# Patient Record
Sex: MALE | Race: WHITE | NOT HISPANIC OR LATINO
[De-identification: names, ages, dates, MRNs, and addresses within clinical notes are randomized per-mention and may not be internally consistent; named-entity substitution may affect disease eponyms.]

---

## 2022-12-14 ENCOUNTER — TRANSCRIPTION ENCOUNTER (OUTPATIENT)
Age: 72
End: 2022-12-14

## 2023-01-07 ENCOUNTER — FORM ENCOUNTER (OUTPATIENT)
Age: 73
End: 2023-01-07

## 2023-01-24 ENCOUNTER — TRANSCRIPTION ENCOUNTER (OUTPATIENT)
Age: 73
End: 2023-01-24

## 2023-02-07 ENCOUNTER — FORM ENCOUNTER (OUTPATIENT)
Age: 73
End: 2023-02-07

## 2023-02-14 ENCOUNTER — APPOINTMENT (OUTPATIENT)
Dept: INFECTIOUS DISEASE | Facility: CLINIC | Age: 73
End: 2023-02-14
Payer: MEDICARE

## 2023-02-14 ENCOUNTER — NON-APPOINTMENT (OUTPATIENT)
Age: 73
End: 2023-02-14

## 2023-02-14 VITALS
TEMPERATURE: 97.8 F | HEART RATE: 65 BPM | DIASTOLIC BLOOD PRESSURE: 50 MMHG | SYSTOLIC BLOOD PRESSURE: 98 MMHG | OXYGEN SATURATION: 98 %

## 2023-02-14 DIAGNOSIS — L03.115 CELLULITIS OF RIGHT LOWER LIMB: ICD-10-CM

## 2023-02-14 PROCEDURE — 99215 OFFICE O/P EST HI 40 MIN: CPT

## 2023-02-15 PROBLEM — L03.115 CELLULITIS OF FOOT, RIGHT: Status: ACTIVE | Noted: 2023-02-13

## 2023-02-15 RX ORDER — FLASH GLUCOSE SENSOR
KIT MISCELLANEOUS
Qty: 2 | Refills: 0 | Status: COMPLETED | COMMUNITY
Start: 2022-12-30

## 2023-02-15 RX ORDER — INSULIN DETEMIR 100 [IU]/ML
100 INJECTION, SOLUTION SUBCUTANEOUS
Qty: 3 | Refills: 0 | Status: ACTIVE | COMMUNITY
Start: 2022-12-25

## 2023-02-15 RX ORDER — AMOXICILLIN 500 MG/1
500 CAPSULE ORAL
Qty: 21 | Refills: 0 | Status: COMPLETED | COMMUNITY
Start: 2022-10-18

## 2023-02-15 RX ORDER — NIFEDIPINE 60 MG/1
60 TABLET, FILM COATED, EXTENDED RELEASE ORAL
Qty: 90 | Refills: 0 | Status: COMPLETED | COMMUNITY
Start: 2022-03-03

## 2023-02-15 RX ORDER — SPIRONOLACTONE 25 MG/1
25 TABLET ORAL
Qty: 30 | Refills: 0 | Status: ACTIVE | COMMUNITY
Start: 2023-01-03

## 2023-02-15 RX ORDER — METOPROLOL SUCCINATE 50 MG/1
50 TABLET, EXTENDED RELEASE ORAL
Qty: 1 | Refills: 0 | Status: COMPLETED | COMMUNITY
Start: 2022-12-23

## 2023-02-15 RX ORDER — SODIUM CHLORIDE 0.65 %
0.65 AEROSOL, SPRAY (ML) NASAL
Qty: 77 | Refills: 0 | Status: COMPLETED | COMMUNITY
Start: 2022-12-23

## 2023-02-15 RX ORDER — CHLORHEXIDINE GLUCONATE 4 %
400 (240 MG) LIQUID (ML) TOPICAL
Qty: 1 | Refills: 0 | Status: COMPLETED | COMMUNITY
Start: 2022-12-23

## 2023-02-15 RX ORDER — ROSUVASTATIN CALCIUM 20 MG/1
20 TABLET, FILM COATED ORAL
Qty: 90 | Refills: 0 | Status: ACTIVE | COMMUNITY
Start: 2023-01-04

## 2023-02-15 RX ORDER — SACUBITRIL AND VALSARTAN 24; 26 MG/1; MG/1
24-26 TABLET, FILM COATED ORAL
Qty: 1 | Refills: 0 | Status: COMPLETED | COMMUNITY
Start: 2022-12-23

## 2023-02-15 RX ORDER — SPIRONOLACTONE 50 MG/1
50 TABLET ORAL
Qty: 1 | Refills: 0 | Status: COMPLETED | COMMUNITY
Start: 2022-12-23

## 2023-02-15 RX ORDER — TORSEMIDE 20 MG/1
20 TABLET ORAL
Qty: 1 | Refills: 0 | Status: COMPLETED | COMMUNITY
Start: 2022-12-26

## 2023-02-15 RX ORDER — HYDROCORTISONE ACETATE 25 MG/1
25 SUPPOSITORY RECTAL
Qty: 24 | Refills: 0 | Status: COMPLETED | COMMUNITY
Start: 2022-12-24

## 2023-02-15 RX ORDER — ACETAMINOPHEN AND CODEINE PHOSPHATE 300; 30 MG/1; MG/1
300-30 TABLET ORAL
Qty: 10 | Refills: 0 | Status: COMPLETED | COMMUNITY
Start: 2022-10-10

## 2023-02-15 RX ORDER — FUROSEMIDE 40 MG/1
40 TABLET ORAL
Qty: 180 | Refills: 0 | Status: COMPLETED | COMMUNITY
Start: 2022-05-10

## 2023-02-15 RX ORDER — ALPRAZOLAM 0.25 MG/1
0.25 TABLET ORAL
Qty: 14 | Refills: 0 | Status: COMPLETED | COMMUNITY
Start: 2022-12-24

## 2023-02-15 RX ORDER — FLASH GLUCOSE SCANNING READER
EACH MISCELLANEOUS
Qty: 1 | Refills: 0 | Status: COMPLETED | COMMUNITY
Start: 2022-12-30

## 2023-02-15 RX ORDER — ERTAPENEM SODIUM 1 G/1
1 INJECTION, POWDER, LYOPHILIZED, FOR SOLUTION INTRAMUSCULAR; INTRAVENOUS
Refills: 0 | Status: ACTIVE | COMMUNITY

## 2023-02-15 RX ORDER — TORSEMIDE 10 MG/1
10 TABLET ORAL
Qty: 3 | Refills: 0 | Status: COMPLETED | COMMUNITY
Start: 2022-12-29

## 2023-02-15 RX ORDER — SODIUM BICARBONATE 650 MG/1
650 TABLET ORAL
Qty: 2 | Refills: 0 | Status: COMPLETED | COMMUNITY
Start: 2022-12-23

## 2023-02-15 RX ORDER — ALPRAZOLAM 1 MG/1
1 TABLET ORAL
Qty: 15 | Refills: 0 | Status: COMPLETED | COMMUNITY
Start: 2022-11-10

## 2023-02-15 RX ORDER — GABAPENTIN 300 MG/1
300 CAPSULE ORAL
Qty: 1 | Refills: 0 | Status: ACTIVE | COMMUNITY
Start: 2022-12-23

## 2023-02-15 RX ORDER — OXYCODONE AND ACETAMINOPHEN 5; 325 MG/1; MG/1
5-325 TABLET ORAL
Qty: 28 | Refills: 0 | Status: COMPLETED | COMMUNITY
Start: 2023-01-04

## 2023-02-15 RX ORDER — PEN NEEDLE, DIABETIC 29 G X1/2"
31G X 5 MM NEEDLE, DISPOSABLE MISCELLANEOUS
Qty: 100 | Refills: 0 | Status: COMPLETED | COMMUNITY
Start: 2022-06-04

## 2023-02-15 RX ORDER — AMOXICILLIN 875 MG/1
875 TABLET, FILM COATED ORAL
Qty: 14 | Refills: 0 | Status: COMPLETED | COMMUNITY
Start: 2022-10-10

## 2023-02-15 RX ORDER — SACUBITRIL AND VALSARTAN 97; 103 MG/1; MG/1
97-103 TABLET, FILM COATED ORAL
Qty: 180 | Refills: 0 | Status: COMPLETED | COMMUNITY
Start: 2022-10-17

## 2023-02-15 RX ORDER — HYDROCODONE BITARTRATE AND ACETAMINOPHEN 5; 325 MG/1; MG/1
5-325 TABLET ORAL
Qty: 12 | Refills: 0 | Status: COMPLETED | COMMUNITY
Start: 2022-10-18

## 2023-02-15 RX ORDER — PANTOPRAZOLE 40 MG/1
40 TABLET, DELAYED RELEASE ORAL
Qty: 1 | Refills: 0 | Status: ACTIVE | COMMUNITY
Start: 2022-12-23

## 2023-02-15 RX ORDER — CLONIDINE HYDROCHLORIDE 0.1 MG/1
0.1 TABLET ORAL
Qty: 270 | Refills: 0 | Status: COMPLETED | COMMUNITY
Start: 2022-07-13

## 2023-02-15 RX ORDER — BUMETANIDE 2 MG/1
2 TABLET ORAL
Qty: 60 | Refills: 0 | Status: ACTIVE | COMMUNITY
Start: 2023-01-03

## 2023-02-15 RX ORDER — RIVAROXABAN 15 MG/1
15 TABLET, FILM COATED ORAL
Qty: 7 | Refills: 0 | Status: COMPLETED | COMMUNITY
Start: 2022-12-23

## 2023-02-15 RX ORDER — INSULIN LISPRO 100 [IU]/ML
100 INJECTION, SOLUTION INTRAVENOUS; SUBCUTANEOUS
Qty: 6 | Refills: 0 | Status: ACTIVE | COMMUNITY
Start: 2022-12-23

## 2023-02-15 NOTE — ASSESSMENT
[FreeTextEntry1] : patient with multiple medical problems\par patient s/p cholecystectomy with complication of abscess in the gall bladder fossa treated with drainage and IV antibiotics .\par \par He was re-admitted due to cellulitis of the foot but was found to have a liver abscess with the same organisms (ESBL E coli and Strep anginosus).  He had an E coli bacteremia\par \par He is tolerating the antibiotics \par \par Drainage from the catheter is minimal\par It is unclear if this is due to the abscess having resolved, or the location of the catheter\par \par goals of treatment include treating with IV invanz until the lesion resolves on CT scan.\par He will continue to require IV antibiotics as the ESBL is resistant to all oral therapy\par \par Plan:\par \par 1. repeat CT scan at [place] with evaluation by IR of catheter\par 2. continue with Invanz at least thru 28 Feb\par 3. f/u 28 Feb\par 4. Labs weekly at [facility]: CBC, CMP\par \par discussed treatment plan with the patient, who is in agreement [Treatment Education] : treatment education [Treatment Adherence] : treatment adherence [Drug Interactions / Side Effects] : drug interactions/side effects [Anticipatory Guidance] : anticipatory guidance

## 2023-02-15 NOTE — REASON FOR VISIT
[Post Hospitalization] : a post hospitalization visit [Family Member] : family member [FreeTextEntry1] : fu from hospital stay ([facility]) for Bacteremia. Drain in place\par IVAB(Invanz 1g QD), with BETSY (Brandon Kessler) \par discuss recent bw results.. still needs apt with IR

## 2023-02-15 NOTE — HISTORY OF PRESENT ILLNESS
[FreeTextEntry1] : 71 yo Male with chronic lower extremity lymphedema and mild right foot cellulitis, and heel ulcer \par Patient with CAD S/P CABG, Prostate Cancer S/P Prostatectomy, PM, DM, HTN, CHF\par s/p cholecystectomy at [facility] 23 Nov.\par \par He was initially admitted from 4 to 23 Dec 2022.\par He was found to have a fluid collection in the gall bladder fossa.\par aspiration of the  5 cc of purulent fluid by IR on 4 Dec, which grew out an ESBL E coli and a Strep anginosus.  He was treated with meropenem\par \par He was re-admitted to [place] from 5 Jan to 8 Feb 2023.\par He was initially admitted due to BL LE edema and cellulitis of the R heel.  It responded to Ancef\par \par \par He developed septic shock on 1/12, requiring transfer t the CCU on [facility].\par He required intubation on 01/14 due to worsening encephalopathy & Acidosis\par Blood cultures positive from 1/11 with an ESBL E coli.\par \par Patient with liver abscess: s/p drainage by IR.  Culture with Strep anginosis and ESBL E coli 14 Jan.\par He was treated with IV Invanz\par He was discharged to a [facility] to complete his iv antibiotics \par \par \par POD #31 -  drainage catheter placement into the liver\par #31 antibiotics - Invanz\par --\par Patient with scant drainage from the catheter.\par he is tolerating the antibiotics \par \par Patient denies any new symptoms: nausea, emesis, diarrhea\par Patient denies any pain in the RUQ except around the exit site of the catheter\par \par

## 2023-02-15 NOTE — REVIEW OF SYSTEMS
[Fever] : no fever [Chills] : no chills [Body Aches] : no body aches [Eye Pain] : no eye pain [Sore Throat] : no sore throat [Hoarseness] : no hoarseness [Chest Pain] : no chest pain [Palpitations] : no palpitations [Shortness Of Breath] : no shortness of breath [Wheezing] : no wheezing [Cough] : no cough [SOB on Exertion] : no shortness of breath during exertion [Sputum] : not coughing up ~M sputum [Pleuritic Chest Pain] : no pleuritic chest pain [Abdominal Pain] : abdominal pain [Vomiting] : no vomiting [Constipation] : no constipation [Diarrhea] : no diarrhea [Dysuria] : no dysuria [Joint Pain] : no joint pain [Joint Swelling] : no joint swelling [Skin Lesions] : no skin lesions [Skin Wound] : no skin wound [Confused] : no confusion [Dizziness] : no dizziness [Anxiety] : anxiety [FreeTextEntry7] : R upper quad at catheter site

## 2023-02-15 NOTE — PHYSICAL EXAM
[General Appearance - Alert] : alert [General Appearance - In No Acute Distress] : in no acute distress [General Appearance - Well Nourished] : well nourished [General Appearance - Well-Appearing] : healthy appearing [Sclera] : the sclera and conjunctiva were normal [PERRL With Normal Accommodation] : pupils were equal in size, round, reactive to light [Extraocular Movements] : extraocular movements were intact [Outer Ear] : the ears and nose were normal in appearance [Examination Of The Oral Cavity] : the lips and gums were normal [Oropharynx] : the oropharynx was normal with no thrush [Neck Appearance] : the appearance of the neck was normal [Neck Cervical Mass (___cm)] : no neck mass was observed [Auscultation Breath Sounds / Voice Sounds] : lungs were clear to auscultation bilaterally [Heart Rate And Rhythm] : heart rate was normal and rhythm regular [Heart Sounds] : normal S1 and S2 [Murmurs] : no murmurs [Bowel Sounds] : normal bowel sounds [Abdomen Soft] : soft [Abdomen Tenderness] : non-tender [Abdomen Mass (___ Cm)] : no abdominal mass palpated [Costovertebral Angle Tenderness] : no CVA tenderness [FreeTextEntry1] : R lateral drain tube without erythema [Cervical Lymph Nodes Enlarged Posterior Bilaterally] : posterior cervical [Cervical Lymph Nodes Enlarged Anterior Bilaterally] : anterior cervical [Supraclavicular Lymph Nodes Enlarged Bilaterally] : supraclavicular [Musculoskeletal - Swelling] : no joint swelling [Nail Clubbing] : no clubbing  or cyanosis of the fingernails [Skin Color & Pigmentation] : normal skin color and pigmentation [] : no rash [Cranial Nerves] : cranial nerves 2-12 were intact [Deep Tendon Reflexes (DTR)] : deep tendon reflexes were 2+ and symmetric [Affect] : the affect was normal

## 2023-02-28 ENCOUNTER — APPOINTMENT (OUTPATIENT)
Dept: INFECTIOUS DISEASE | Facility: CLINIC | Age: 73
End: 2023-02-28
Payer: MEDICARE

## 2023-02-28 VITALS
OXYGEN SATURATION: 96 % | HEART RATE: 48 BPM | TEMPERATURE: 98.1 F | SYSTOLIC BLOOD PRESSURE: 108 MMHG | DIASTOLIC BLOOD PRESSURE: 60 MMHG

## 2023-02-28 PROCEDURE — 99214 OFFICE O/P EST MOD 30 MIN: CPT

## 2023-03-01 ENCOUNTER — OUTPATIENT (OUTPATIENT)
Dept: OUTPATIENT SERVICES | Facility: HOSPITAL | Age: 73
LOS: 1 days | End: 2023-03-01
Payer: COMMERCIAL

## 2023-03-01 ENCOUNTER — APPOINTMENT (OUTPATIENT)
Age: 73
End: 2023-03-01
Payer: MEDICARE

## 2023-03-01 DIAGNOSIS — K75.0 ABSCESS OF LIVER: ICD-10-CM

## 2023-03-01 PROCEDURE — 74170 CT ABD WO CNTRST FLWD CNTRST: CPT

## 2023-03-01 PROCEDURE — 74170 CT ABD WO CNTRST FLWD CNTRST: CPT | Mod: 26

## 2023-03-01 NOTE — HISTORY OF PRESENT ILLNESS
[FreeTextEntry1] : 73 yo Male with chronic lower extremity lymphedema and mild right foot cellulitis, and heel ulcer \par Patient with CAD S/P CABG, Prostate Cancer S/P Prostatectomy, PM, DM, HTN, CHF\par s/p cholecystectomy at [facility] 23 Nov.\par \par He was initially admitted from 4 to 23 Dec 2022.\par He was found to have a fluid collection in the gall bladder fossa.\par aspiration of the  5 cc of purulent fluid by IR on 4 Dec, which grew out an ESBL E coli and a Strep anginosus.  He was treated with meropenem\par \par He was re-admitted to [place] from 5 Jan to 8 Feb 2023.\par He was initially admitted due to BL LE edema and cellulitis of the R heel.  It responded to Ancef\par \par He developed septic shock on 1/12, requiring transfer t the CCU on [facility].\par He required intubation on 01/14 due to worsening encephalopathy & Acidosis\par Blood cultures positive from 1/11 with an ESBL E coli.\par \par Patient with liver abscess: s/p drainage by IR.  Culture with Strep anginosis and ESBL E coli 14 Jan.\par He was treated with IV Invanz\par He was discharged to a [facility] to complete his iv antibiotics \par \par \par POD #45 -  drainage catheter placement into the liver\par #45 antibiotics - Invanz\par --\par Patient with minimal drainage from the catheter.\par he is tolerating the antibiotics \par \par Patient denies any new symptoms: nausea, emesis, diarrhea\par Patient denies any pain i\par

## 2023-03-01 NOTE — ASSESSMENT
[FreeTextEntry1] : patient with multiple medical problems\par patient s/p cholecystectomy with complication of abscess in the gall bladder fossa treated with drainage and IV antibiotics.\par \par He was re-admitted due to cellulitis of the foot but was found to have a liver abscess with the same organisms (ESBL E coli and Strep anginosus). He had an E coli bacteremia\par \par He is tolerating the antibiotics \par \par Drainage from the catheter is minimal\par It is unclear if this is due to the abscess having resolved, or the location of the catheter\par he is scheduled for CT scan and a catheter check\par \par goals of treatment include treating with IV invanz until the lesion resolves on CT scan.\par He will continue to require IV antibiotics as the ESBL is resistant to all oral therapy\par \par Patient with a telehealth appointment has been set with a hepatologist\par \par Plan:\par \par 1. repeat CT scan at [place] with evaluation by IR of catheter\par 2. continue with Invanz pending CT scan\par 3. f/u 2 weeks \par 4. Labs weekly at [facility]: CBC, CMP\par \par discussed treatment plan with the patient, who is in agreement.  [Treatment Education] : treatment education [Drug Interactions / Side Effects] : drug interactions/side effects [Anticipatory Guidance] : anticipatory guidance

## 2023-03-01 NOTE — REASON FOR VISIT
[Follow-Up: _____] : a [unfilled] follow-up visit [Spouse] : spouse [FreeTextEntry1] : 2 week fu, Bacteremia/Liver abscess\par IVAB(Invanz 1g QD), with SNF \par discuss recent bw results \par Upcoming apt with IR on Friday (tentatively) & CT scheduled tomorrow with [facility] radiology (and telephonic apt with liver surgeon, Dr.Matthew Gambino)

## 2023-03-01 NOTE — PHYSICAL EXAM
[General Appearance - Alert] : alert [General Appearance - In No Acute Distress] : in no acute distress [General Appearance - Well Nourished] : well nourished [General Appearance - Well-Appearing] : healthy appearing [Sclera] : the sclera and conjunctiva were normal [PERRL With Normal Accommodation] : pupils were equal in size, round, reactive to light [Extraocular Movements] : extraocular movements were intact [Outer Ear] : the ears and nose were normal in appearance [Examination Of The Oral Cavity] : the lips and gums were normal [Oropharynx] : the oropharynx was normal with no thrush [Neck Appearance] : the appearance of the neck was normal [Neck Cervical Mass (___cm)] : no neck mass was observed [Auscultation Breath Sounds / Voice Sounds] : lungs were clear to auscultation bilaterally [Heart Rate And Rhythm] : heart rate was normal and rhythm regular [Heart Sounds] : normal S1 and S2 [Murmurs] : no murmurs [Bowel Sounds] : normal bowel sounds [Abdomen Soft] : soft [Abdomen Tenderness] : non-tender [Abdomen Mass (___ Cm)] : no abdominal mass palpated [Costovertebral Angle Tenderness] : no CVA tenderness [FreeTextEntry1] : R lateral drain tube without erythema; <5 cc visible in bulb after 24 hours [Cervical Lymph Nodes Enlarged Posterior Bilaterally] : posterior cervical [Cervical Lymph Nodes Enlarged Anterior Bilaterally] : anterior cervical [Supraclavicular Lymph Nodes Enlarged Bilaterally] : supraclavicular [Musculoskeletal - Swelling] : no joint swelling [Nail Clubbing] : no clubbing  or cyanosis of the fingernails [Skin Color & Pigmentation] : normal skin color and pigmentation [] : no rash [Cranial Nerves] : cranial nerves 2-12 were intact [Deep Tendon Reflexes (DTR)] : deep tendon reflexes were 2+ and symmetric [Oriented To Time, Place, And Person] : oriented to person, place, and time

## 2023-03-01 NOTE — REVIEW OF SYSTEMS
Swimmer's Ear: Care Instructions  Your Care Instructions    Swimmer's ear (otitis externa) is inflammation or infection of the ear canal. This is the passage that leads from the outer ear to the eardrum. Any water, sand, or other debris that gets into the ear canal and stays there can cause swimmer's ear. Putting cotton swabs or other items in the ear to clean it can also cause this problem. Swimmer's ear can be very painful. But you can treat the pain and infection with medicines. You should feel better in a few days. Follow-up care is a key part of your treatment and safety. Be sure to make and go to all appointments, and call your doctor if you are having problems. It's also a good idea to know your test results and keep a list of the medicines you take. How can you care for yourself at home? Cleaning and care  · Use antibiotic drops as your doctor directs. · Do not insert ear drops (other than the antibiotic ear drops) or anything else into the ear unless your doctor has told you to. · Avoid getting water in the ear until the problem clears up. Use cotton lightly coated with petroleum jelly as an earplug. Do not use plastic earplugs. · Use a hair dryer set on low to carefully dry the ear after you shower. · To ease ear pain, hold a warm washcloth against your ear. · Take pain medicines exactly as directed. ¨ If the doctor gave you a prescription medicine for pain, take it as prescribed. ¨ If you are not taking a prescription pain medicine, ask your doctor if you can take an over-the-counter medicine. Inserting ear drops  · Warm the drops to body temperature by rolling the container in your hands. Or you can place it in a cup of warm water for a few minutes. · Lie down, with your ear facing up. · Place drops inside the ear. Follow your doctor's instructions (or the directions on the label) for how many drops to use.  Gently wiggle the outer ear or pull the ear up and back to help the drops get into the ear. · It's important to keep the liquid in the ear canal for 3 to 5 minutes. When should you call for help? Call your doctor now or seek immediate medical care if:  ? · You have a new or higher fever. ? · You have new or worse pain, swelling, warmth, or redness around or behind your ear. ? · You have new or increasing pus or blood draining from your ear. ? Watch closely for changes in your health, and be sure to contact your doctor if:  ? · You are not getting better after 2 days (48 hours). Where can you learn more? Go to http://sophie-papo.info/. Enter C706 in the search box to learn more about \"Swimmer's Ear: Care Instructions. \"  Current as of: May 12, 2017  Content Version: 11.4  © 1538-1201 [employer]. Care instructions adapted under license by [email or web address] (which disclaims liability or warranty for this information). If you have questions about a medical condition or this instruction, always ask your healthcare professional. [place] [address] any warranty or liability for your use of this information. [Fever] : no fever [Chills] : no chills [Body Aches] : no body aches [Eye Pain] : no eye pain [Sore Throat] : sore throat [Chest Pain] : no chest pain [Palpitations] : no palpitations [Shortness Of Breath] : no shortness of breath [Wheezing] : no wheezing [Cough] : no cough [SOB on Exertion] : no shortness of breath during exertion [Sputum] : not coughing up ~M sputum [Pleuritic Chest Pain] : no pleuritic chest pain [Abdominal Pain] : no abdominal pain [Vomiting] : no vomiting [Constipation] : no constipation [Diarrhea] : no diarrhea [Dysuria] : no dysuria [Joint Pain] : no joint pain [Skin Lesions] : no skin lesions [Skin Wound] : no skin wound [Confused] : no confusion [Dizziness] : no dizziness [Anxiety] : no anxiety

## 2023-03-03 ENCOUNTER — APPOINTMENT (OUTPATIENT)
Dept: SURGICAL ONCOLOGY | Facility: CLINIC | Age: 73
End: 2023-03-03
Payer: MEDICARE

## 2023-03-03 ENCOUNTER — APPOINTMENT (OUTPATIENT)
Dept: CT IMAGING | Facility: IMAGING CENTER | Age: 73
End: 2023-03-03

## 2023-03-03 PROCEDURE — 99204 OFFICE O/P NEW MOD 45 MIN: CPT | Mod: 95

## 2023-03-10 ENCOUNTER — NON-APPOINTMENT (OUTPATIENT)
Age: 73
End: 2023-03-10

## 2023-03-14 ENCOUNTER — APPOINTMENT (OUTPATIENT)
Dept: INFECTIOUS DISEASE | Facility: CLINIC | Age: 73
End: 2023-03-14
Payer: MEDICARE

## 2023-03-14 VITALS
RESPIRATION RATE: 15 BRPM | TEMPERATURE: 97.9 F | HEART RATE: 113 BPM | OXYGEN SATURATION: 95 % | SYSTOLIC BLOOD PRESSURE: 106 MMHG | DIASTOLIC BLOOD PRESSURE: 62 MMHG

## 2023-03-14 DIAGNOSIS — K75.0 ABSCESS OF LIVER: ICD-10-CM

## 2023-03-14 DIAGNOSIS — R78.81 BACTEREMIA: ICD-10-CM

## 2023-03-14 DIAGNOSIS — A49.8 OTHER BACTERIAL INFECTIONS OF UNSPECIFIED SITE: ICD-10-CM

## 2023-03-14 DIAGNOSIS — B95.5 BACTEREMIA: ICD-10-CM

## 2023-03-14 DIAGNOSIS — Z16.12 OTHER BACTERIAL INFECTIONS OF UNSPECIFIED SITE: ICD-10-CM

## 2023-03-14 DIAGNOSIS — B96.20 BACTEREMIA: ICD-10-CM

## 2023-03-14 PROCEDURE — 99214 OFFICE O/P EST MOD 30 MIN: CPT

## 2023-03-16 PROBLEM — R78.81 E COLI BACTEREMIA: Status: ACTIVE | Noted: 2023-02-15

## 2023-03-16 PROBLEM — R78.81 BACTEREMIA DUE TO STREPTOCOCCUS: Status: ACTIVE | Noted: 2023-02-15

## 2023-03-16 PROBLEM — A49.8 INFECTION DUE TO EXTENDED-SPECTRUM BETA-LACTAMASE-PRODUCING ESCHERICHIA COLI: Status: ACTIVE | Noted: 2023-02-15

## 2023-03-16 PROBLEM — K75.0 LIVER ABSCESS: Status: ACTIVE | Noted: 2023-02-13

## 2023-03-16 NOTE — ASSESSMENT
[FreeTextEntry1] : patient with multiple medical problems\par patient s/p cholecystectomy with complication of abscess in the gall bladder fossa treated with drainage and IV antibiotics.\par \par He was re-admitted due to cellulitis of the foot but was found to have a liver abscess with the same organisms (ESBL E coli and Strep anginosus). He had an E coli bacteremia\par \par He is tolerating the antibiotics \par \par Drainage catheter has been removed after studies showed resolution of abscess\par \par goals of treatment include treating with IV invanz until the lesion resolves on CT scan have been met.\par \par Plan:\par \par 1. d/c invanz\par 2. SNF to remove PICC line\par 3. f/u PRN\par \par discussed treatment plan with the patient, who is in agreement.

## 2023-03-16 NOTE — HISTORY OF PRESENT ILLNESS
[FreeTextEntry1] : 71 yo Male with chronic lower extremity lymphedema and mild right foot cellulitis, and heel ulcer \par Patient with CAD S/P CABG, Prostate Cancer S/P Prostatectomy, PM, DM, HTN, CHF\par s/p cholecystectomy at [facility] 23 Nov.\par \par He was initially admitted from 4 to 23 Dec 2022.\par He was found to have a fluid collection in the gall bladder fossa.\par aspiration of the  5 cc of purulent fluid by IR on 4 Dec, which grew out an ESBL E coli and a Strep anginosus.  He was treated with meropenem\par \par He was re-admitted to [place] from 5 Jan to 8 Feb 2023.\par He was initially admitted due to BL LE edema and cellulitis of the R heel.  It responded to Ancef\par \par He developed septic shock on 1/12, requiring transfer t the CCU on [facility].\par He required intubation on 01/14 due to worsening encephalopathy & Acidosis\par Blood cultures positive from 1/11 with an ESBL E coli.\par \par Patient with liver abscess: s/p drainage by IR.  Culture with Strep anginosis and ESBL E coli 14 Jan.\par He was treated with IV Invanz\par He was discharged to a [facility] to complete his iv antibiotics \par \par \par POD #59 -  drainage catheter placement into the liver\par #59 antibiotics - Invanz\par --\par Patient s/p removal of drainage catheter after repeat CT showed resolution of abscess and catheter check confirmed collapse of cavity\par he is tolerating the antibiotics \par \par Patient denies any new symptoms: nausea, emesis, diarrhea\par Patient denies any pain\par \par He is s/p ov with ONC/Surgeon Maki (notes reviewed)\par

## 2023-03-16 NOTE — PHYSICAL EXAM
[General Appearance - Alert] : alert [General Appearance - In No Acute Distress] : in no acute distress [General Appearance - Well Nourished] : well nourished [General Appearance - Well-Appearing] : healthy appearing [Sclera] : the sclera and conjunctiva were normal [Extraocular Movements] : extraocular movements were intact [PERRL With Normal Accommodation] : pupils were equal in size, round, reactive to light [Outer Ear] : the ears and nose were normal in appearance [Examination Of The Oral Cavity] : the lips and gums were normal [Oropharynx] : the oropharynx was normal with no thrush [Neck Appearance] : the appearance of the neck was normal [Neck Cervical Mass (___cm)] : no neck mass was observed [Auscultation Breath Sounds / Voice Sounds] : lungs were clear to auscultation bilaterally [Heart Rate And Rhythm] : heart rate was normal and rhythm regular [Heart Sounds] : normal S1 and S2 [Murmurs] : no murmurs [Bowel Sounds] : normal bowel sounds [Abdomen Soft] : soft [Abdomen Tenderness] : non-tender [Abdomen Mass (___ Cm)] : no abdominal mass palpated [Costovertebral Angle Tenderness] : no CVA tenderness [FreeTextEntry1] : s/p drain removal [Cervical Lymph Nodes Enlarged Posterior Bilaterally] : posterior cervical [Cervical Lymph Nodes Enlarged Anterior Bilaterally] : anterior cervical [Supraclavicular Lymph Nodes Enlarged Bilaterally] : supraclavicular [Musculoskeletal - Swelling] : no joint swelling [Nail Clubbing] : no clubbing  or cyanosis of the fingernails [Skin Color & Pigmentation] : normal skin color and pigmentation [] : no rash [Cranial Nerves] : cranial nerves 2-12 were intact [Deep Tendon Reflexes (DTR)] : deep tendon reflexes were 2+ and symmetric [Oriented To Time, Place, And Person] : oriented to person, place, and time

## 2023-03-16 NOTE — REVIEW OF SYSTEMS
[Fever] : no fever [Chills] : no chills [Body Aches] : no body aches [Difficulty Sleeping] : no difficulty sleeping [Eye Pain] : no eye pain [Sore Throat] : no sore throat [Chest Pain] : no chest pain [Palpitations] : no palpitations [Shortness Of Breath] : no shortness of breath [Wheezing] : no wheezing [Cough] : no cough [SOB on Exertion] : no shortness of breath during exertion [Sputum] : not coughing up ~M sputum [Pleuritic Chest Pain] : no pleuritic chest pain [Abdominal Pain] : no abdominal pain [Vomiting] : no vomiting [Constipation] : no constipation [Diarrhea] : no diarrhea [Dysuria] : no dysuria [Joint Pain] : no joint pain [Skin Lesions] : no skin lesions [Skin Wound] : no skin wound [Confused] : no confusion [Dizziness] : no dizziness [Anxiety] : no anxiety

## 2023-03-16 NOTE — REASON FOR VISIT
[Follow-Up: _____] : a [unfilled] follow-up visit [Spouse] : spouse [FreeTextEntry1] : 2 week fu, Bacteremia/Liver abscess\par IVAB(Meropenem 1g q8h), with SNF (see chart note) \par discuss recent CT results

## 2023-03-17 NOTE — ASSESSMENT
[FreeTextEntry1] : Mr. DAVID SCHOENFELD is a 72 year old male who presents today for an initial consultation via telehealth. PMH of CAD s/p CABG, prostate cancer s/p prostatectomy, HTN, DM, most recently s/p cholecystectomy for chronic cholecystitis at [facility] 11/23/22 and had complicated post op course c/b bile leak and fluid collection and sepsis s/p ERCP with stent placement on 12/8/22. Presented to [facility] on 1/5/23 and was admitted until 2/8/23 d/t LE LE edema, cellulitis. Developed Septic shock and encephalopathy and blood cultures positive for ESBL E coli. CT 2/1/23  noted dropped gallstones and persistent abscess in posterior segment of R hepatic lobe s/p IR drain placement and treated with IV abx and followed by ID. He was discharged to [facility] for completion of his abx. There is minimal draining for the IR drain, he is scheduled for a tube study later today at [place]. \par I reviewed all of his old imaging, hospital notes, and diagnostic reports and labs in addition to the CT he just completed on 3/1/23 that demonstrates near complete resolution of the abscess with minimal residual fluid.  No additional abscesses.  Discussed that he should get the tube study today and that drain should come out.  I would recommend continuing abx for about a week following the drain removal. I would then recommend a repeat ERCP to ensure there is no active leak and then remove the stent. If he develops another infection after the drain and stent are removed then it would be reasonable to consider reoperation to removed the retained stones.  Hopefully it will not come to that and he will continue to improve.  Discussed that I am happy to speak with any of his treating physicians if they would like to reach out to discuss his case. I will see him PRN. \par \par Today, I personally spent 60 minutes in total time including reviewing imaging and studies, discussing complex treatment regimens, direct face to face time with the patient, patient education and counseling.\par

## 2023-03-17 NOTE — HISTORY OF PRESENT ILLNESS
[de-identified] : This visit was provided via telehealth using real-time 2-way audio visual technology. The patient, DAVID SCHOENFELD, was located at home [address] [place] [address], [place] [address] at the time of the visit. This provider was located at the clinic in [place], [place] at the time of the visit. The provider, patient and wife participated in the telehealth encounter.  Verbal consent was given 03/03/2023 by the patient. \par \par Mr. DAVID SCHOENFELD is a 72 year old male who presents today for an initial consultation via telehealth. PMH of CAD s/p CABG, prostate cancer s/p prostatectomy, HTN, DM, most recently s/p cholecystectomy for chronic cholecystitis at [facility] 11/23/22 and had complicated post op course c/b bile leak and fluid collection and sepsis s/p ERCP with stent placement on 12/8/22. Presented to [facility] on 1/5/23 and was admitted until 2/8/23 d/t LE LE edema, cellulitis. Developed Septic shock and encephalopathy and blood cultures positive for ESBL E coli. CT 2/1/23  noted dropped gallstones and persistent abscess in posterior segment of R hepatic lobe s/p IR drain placement and treated with IV abx and followed by ID. He was discharged to [facility] for completion of his abx. There is minimal draining for the IR drain, he is scheduled for a tube study later today at [place]. Had a CT 3/1/23 and presents to discuss an opinion on management.

## 2023-05-26 ENCOUNTER — APPOINTMENT (OUTPATIENT)
Dept: SURGICAL ONCOLOGY | Facility: CLINIC | Age: 73
End: 2023-05-26
Payer: MEDICARE

## 2023-05-26 VITALS
RESPIRATION RATE: 17 BRPM | SYSTOLIC BLOOD PRESSURE: 126 MMHG | DIASTOLIC BLOOD PRESSURE: 74 MMHG | BODY MASS INDEX: 28.58 KG/M2 | WEIGHT: 211 LBS | OXYGEN SATURATION: 99 % | HEIGHT: 72 IN | HEART RATE: 98 BPM

## 2023-05-26 PROCEDURE — 99212 OFFICE O/P EST SF 10 MIN: CPT

## 2023-06-12 NOTE — ADDENDUM
[FreeTextEntry1] : By signing my name below, I, Murtaza Patino, attest that this documentation has been prepared under the direction and in the presence of Davi Gambino MD.\par \par I, Davi Gambino MD, personally performed the services described in this documentation. All medical record entries made by the scribe were at my direction and in my presence. I have reviewed the chart and discharge instructions (if applicable) and agree that the record reflects my personal performance and is accurate and complete.

## 2023-06-12 NOTE — ASSESSMENT
[FreeTextEntry1] : Mr. DAVID SCHOENFELD is a 72 year old male who presents today for follow-up evaluation. PMH of CAD s/p CABG, prostate cancer s/p prostatectomy, HTN, DM, most recently s/p cholecystectomy for chronic cholecystitis at [facility] 11/23/22 and had complicated post op course c/b bile leak and fluid collection and sepsis s/p ERCP with stent placement on 12/8/22. Presented to [facility] on 1/5/23 and was admitted until 2/8/23 d/t LE LE edema, cellulitis. Developed Septic shock and encephalopathy and blood cultures positive for ESBL E coli. CT 2/1/23 noted dropped gallstones and persistent abscess in posterior segment of R hepatic lobe s/p IR drain placement and treated with IV abx and followed by ID. He was discharged to [facility] for completion of his abx.\par \par The patient has been having abdominal pain but I do not believe this to be due to his abdominal cavity biliary stone behind his liver. At this time there is no need for additional intervention. He has been advised on signs and symptoms to look for including but not limited to fevers, nausea, and vomiting. Should he begin experiencing these symptoms we will consider taking the patient to the OR for a washout of any possible recurrent infection.\par \par Today, I personally spent 15 minutes in total time including reviewing imaging and studies, discussing complex treatment regimens, direct face to face time with the patient, patient education and counseling.

## 2023-06-12 NOTE — PHYSICAL EXAM
[FreeTextEntry1] : General: No acute distress. Well nourished and well kept.\par Head: AT/NC\par Eyes: PERRL. EOMI.\par Pulmonary: No respiratory distress.\par Abdomen: Soft. NT/ND. No rebound, no guarding, no rigidity. No peritoneal signs. No masses. No evidence of hernia. Well healed laparoscopic incisions. \par Neurological: A&O x 4.\par Psychiatric: Normal affect and mood.

## 2023-06-12 NOTE — HISTORY OF PRESENT ILLNESS
[de-identified] : Mr. DAVID SCHOENFELD is a 72 year old male who presents today for follow-up evaluation. PMH of CAD s/p CABG, prostate cancer s/p prostatectomy, HTN, DM, most recently s/p cholecystectomy for chronic cholecystitis at [facility] 11/23/22 and had complicated post op course c/b bile leak and fluid collection and sepsis s/p ERCP with stent placement on 12/8/22. Presented to [facility] on 1/5/23 and was admitted until 2/8/23 d/t LE LE edema, cellulitis. Developed Septic shock and encephalopathy and blood cultures positive for ESBL E coli. CT 2/1/23 noted dropped gallstones and persistent abscess in posterior segment of R hepatic lobe s/p IR drain placement and treated with IV abx and followed by ID. He was discharged to [facility] for completion of his abx.\par \par The patient does occasionally have abdominal pain for which he will take Oxycodone. He has also been experiencing what he believes to be gas pain due to it moving to different areas of his abdomen. He last had this pain this morning and he took Gas-X. He notes that he has a right heel wound for which he is undergoing wound care and has undergone antibiotic therapy.

## 2025-05-20 ENCOUNTER — APPOINTMENT (OUTPATIENT)
Dept: INFECTIOUS DISEASE | Facility: CLINIC | Age: 75
End: 2025-05-20

## 2025-05-20 VITALS
HEART RATE: 68 BPM | OXYGEN SATURATION: 90 % | DIASTOLIC BLOOD PRESSURE: 62 MMHG | SYSTOLIC BLOOD PRESSURE: 98 MMHG | TEMPERATURE: 98.1 F

## 2025-05-20 DIAGNOSIS — A49.01 METHICILLIN SUSCEPTIBLE STAPHYLOCOCCUS AUREUS INFECTION, UNSPECIFIED SITE: ICD-10-CM

## 2025-05-20 DIAGNOSIS — E11.621 TYPE 2 DIABETES MELLITUS WITH FOOT ULCER: ICD-10-CM

## 2025-05-20 DIAGNOSIS — M86.9 OSTEOMYELITIS, UNSPECIFIED: ICD-10-CM

## 2025-05-20 DIAGNOSIS — L97.524 TYPE 2 DIABETES MELLITUS WITH FOOT ULCER: ICD-10-CM

## 2025-05-20 PROCEDURE — 99215 OFFICE O/P EST HI 40 MIN: CPT

## 2025-05-20 RX ORDER — DAPAGLIFLOZIN 10 MG/1
TABLET, FILM COATED ORAL
Refills: 0 | Status: ACTIVE | COMMUNITY

## 2025-05-20 RX ORDER — OXYCODONE 5 MG/1
5 TABLET ORAL
Refills: 0 | Status: ACTIVE | COMMUNITY

## 2025-05-20 RX ORDER — INSULIN ASPART 100 [IU]/ML
100 INJECTION, SOLUTION INTRAVENOUS; SUBCUTANEOUS
Refills: 0 | Status: ACTIVE | COMMUNITY

## 2025-05-20 RX ORDER — ATORVASTATIN CALCIUM 80 MG/1
80 TABLET, FILM COATED ORAL
Refills: 0 | Status: ACTIVE | COMMUNITY

## 2025-05-20 RX ORDER — AMITRIPTYLINE HYDROCHLORIDE 50 MG/1
50 TABLET, FILM COATED ORAL
Refills: 0 | Status: ACTIVE | COMMUNITY

## 2025-05-20 RX ORDER — COLLAGENASE SANTYL 250 [ARB'U]/G
OINTMENT TOPICAL
Refills: 0 | Status: ACTIVE | COMMUNITY

## 2025-05-20 RX ORDER — LORATADINE 10 MG/1
10 TABLET ORAL
Refills: 0 | Status: ACTIVE | COMMUNITY

## 2025-05-20 RX ORDER — BENZOCAINE AND MENTHOL, UNSPECIFIED FORM 15; 2.3 MG/1; MG/1
LOZENGE ORAL
Refills: 0 | Status: ACTIVE | COMMUNITY

## 2025-05-20 RX ORDER — METOPROLOL SUCCINATE 25 MG/1
25 TABLET, EXTENDED RELEASE ORAL
Refills: 0 | Status: ACTIVE | COMMUNITY

## 2025-05-20 RX ORDER — HONEY 100 %
PASTE (ML) TOPICAL
Refills: 0 | Status: ACTIVE | COMMUNITY

## 2025-05-20 RX ORDER — APIXABAN 5 MG/1
5 TABLET, FILM COATED ORAL
Refills: 0 | Status: ACTIVE | COMMUNITY

## 2025-05-20 RX ORDER — ASCORBIC ACID 500 MG
TABLET ORAL
Refills: 0 | Status: ACTIVE | COMMUNITY

## 2025-05-20 RX ORDER — MORPHINE SULFATE 15 MG/1
15 TABLET ORAL
Refills: 0 | Status: ACTIVE | COMMUNITY

## 2025-05-20 RX ORDER — SILVER SULFADIAZINE 10 MG/G
1 CREAM TOPICAL
Refills: 0 | Status: ACTIVE | COMMUNITY

## 2025-05-20 RX ORDER — INSULIN GLARGINE 100 [IU]/ML
100 INJECTION, SOLUTION SUBCUTANEOUS
Refills: 0 | Status: ACTIVE | COMMUNITY

## 2025-05-20 RX ORDER — FUROSEMIDE 40 MG/1
40 TABLET ORAL
Refills: 0 | Status: ACTIVE | COMMUNITY

## 2025-05-20 RX ORDER — NYSTATIN 100000 [USP'U]/G
100000 CREAM TOPICAL
Refills: 0 | Status: ACTIVE | COMMUNITY

## 2025-05-20 RX ORDER — ACYCLOVIR 200 MG/5ML
200 SUSPENSION ORAL
Refills: 0 | Status: ACTIVE | COMMUNITY

## 2025-05-20 RX ORDER — MULTIVITAMIN
TABLET ORAL
Refills: 0 | Status: ACTIVE | COMMUNITY

## 2025-05-25 PROBLEM — M86.9 OSTEOMYELITIS OF LEFT FOOT, UNSPECIFIED TYPE: Status: ACTIVE | Noted: 2025-05-25

## 2025-05-25 PROBLEM — E11.621: Status: ACTIVE | Noted: 2025-05-25

## 2025-05-25 PROBLEM — A49.01 MSSA (METHICILLIN SUSCEPTIBLE STAPHYLOCOCCUS AUREUS): Status: ACTIVE | Noted: 2025-05-25
